# Patient Record
Sex: MALE | Race: BLACK OR AFRICAN AMERICAN | URBAN - METROPOLITAN AREA
[De-identification: names, ages, dates, MRNs, and addresses within clinical notes are randomized per-mention and may not be internally consistent; named-entity substitution may affect disease eponyms.]

---

## 2017-03-18 ENCOUNTER — HOSPITAL ENCOUNTER (EMERGENCY)
Facility: CLINIC | Age: 57
Discharge: HOME | End: 2017-03-18
Attending: EMERGENCY MEDICINE | Admitting: EMERGENCY MEDICINE
Payer: COMMERCIAL

## 2017-03-18 ENCOUNTER — APPOINTMENT (OUTPATIENT)
Dept: GENERAL RADIOLOGY | Facility: CLINIC | Age: 57
End: 2017-03-18
Attending: EMERGENCY MEDICINE
Payer: COMMERCIAL

## 2017-03-18 VITALS
TEMPERATURE: 98.3 F | OXYGEN SATURATION: 100 % | RESPIRATION RATE: 16 BRPM | DIASTOLIC BLOOD PRESSURE: 73 MMHG | WEIGHT: 142 LBS | BODY MASS INDEX: 21.03 KG/M2 | SYSTOLIC BLOOD PRESSURE: 105 MMHG | HEART RATE: 82 BPM | HEIGHT: 69 IN

## 2017-03-18 DIAGNOSIS — Z72.0 TOBACCO ABUSE: ICD-10-CM

## 2017-03-18 DIAGNOSIS — J20.9 ACUTE BRONCHITIS, UNSPECIFIED ORGANISM: ICD-10-CM

## 2017-03-18 LAB
ANION GAP SERPL CALCULATED.3IONS-SCNC: 6 MMOL/L (ref 3–14)
BASOPHILS # BLD AUTO: 0 10E9/L (ref 0–0.2)
BASOPHILS NFR BLD AUTO: 0.8 %
BUN SERPL-MCNC: 9 MG/DL (ref 7–30)
CALCIUM SERPL-MCNC: 8.5 MG/DL (ref 8.5–10.1)
CHLORIDE SERPL-SCNC: 109 MMOL/L (ref 94–109)
CO2 SERPL-SCNC: 26 MMOL/L (ref 20–32)
CREAT BLD-MCNC: 0.8 MG/DL (ref 0.66–1.25)
CREAT SERPL-MCNC: 0.8 MG/DL (ref 0.66–1.25)
D DIMER PPP FEU-MCNC: NORMAL UG/ML FEU (ref 0–0.5)
DIFFERENTIAL METHOD BLD: ABNORMAL
EOSINOPHIL # BLD AUTO: 0.5 10E9/L (ref 0–0.7)
EOSINOPHIL NFR BLD AUTO: 12.6 %
ERYTHROCYTE [DISTWIDTH] IN BLOOD BY AUTOMATED COUNT: 13.4 % (ref 10–15)
GFR SERPL CREATININE-BSD FRML MDRD: >90 ML/MIN/1.7M2
GFR SERPL CREATININE-BSD FRML MDRD: NORMAL ML/MIN/1.7M2
GLUCOSE SERPL-MCNC: 89 MG/DL (ref 70–99)
HCT VFR BLD AUTO: 44.3 % (ref 40–53)
HGB BLD-MCNC: 14.1 G/DL (ref 13.3–17.7)
IMM GRANULOCYTES # BLD: 0 10E9/L (ref 0–0.4)
IMM GRANULOCYTES NFR BLD: 0.3 %
LYMPHOCYTES # BLD AUTO: 1.2 10E9/L (ref 0.8–5.3)
LYMPHOCYTES NFR BLD AUTO: 31.6 %
MCH RBC QN AUTO: 27.8 PG (ref 26.5–33)
MCHC RBC AUTO-ENTMCNC: 31.8 G/DL (ref 31.5–36.5)
MCV RBC AUTO: 87 FL (ref 78–100)
MONOCYTES # BLD AUTO: 0.3 10E9/L (ref 0–1.3)
MONOCYTES NFR BLD AUTO: 7.8 %
NEUTROPHILS # BLD AUTO: 1.8 10E9/L (ref 1.6–8.3)
NEUTROPHILS NFR BLD AUTO: 46.9 %
NRBC # BLD AUTO: 0 10*3/UL
NRBC BLD AUTO-RTO: 0 /100
PLATELET # BLD AUTO: 220 10E9/L (ref 150–450)
POTASSIUM SERPL-SCNC: 4.2 MMOL/L (ref 3.4–5.3)
RBC # BLD AUTO: 5.07 10E12/L (ref 4.4–5.9)
SODIUM SERPL-SCNC: 141 MMOL/L (ref 133–144)
TROPONIN I BLD-MCNC: 0 UG/L (ref 0–0.1)
WBC # BLD AUTO: 3.7 10E9/L (ref 4–11)

## 2017-03-18 PROCEDURE — 93005 ELECTROCARDIOGRAM TRACING: CPT

## 2017-03-18 PROCEDURE — 82565 ASSAY OF CREATININE: CPT

## 2017-03-18 PROCEDURE — 84484 ASSAY OF TROPONIN QUANT: CPT

## 2017-03-18 PROCEDURE — 25000132 ZZH RX MED GY IP 250 OP 250 PS 637: Performed by: EMERGENCY MEDICINE

## 2017-03-18 PROCEDURE — 80048 BASIC METABOLIC PNL TOTAL CA: CPT | Performed by: EMERGENCY MEDICINE

## 2017-03-18 PROCEDURE — 71020 XR CHEST 2 VW: CPT

## 2017-03-18 PROCEDURE — 85379 FIBRIN DEGRADATION QUANT: CPT | Performed by: EMERGENCY MEDICINE

## 2017-03-18 PROCEDURE — 85025 COMPLETE CBC W/AUTO DIFF WBC: CPT | Performed by: EMERGENCY MEDICINE

## 2017-03-18 PROCEDURE — 99285 EMERGENCY DEPT VISIT HI MDM: CPT | Mod: 25

## 2017-03-18 RX ORDER — IBUPROFEN 200 MG
600 TABLET ORAL EVERY 8 HOURS PRN
Qty: 30 TABLET | Refills: 0 | Status: SHIPPED | OUTPATIENT
Start: 2017-03-18

## 2017-03-18 RX ORDER — BENZONATATE 100 MG/1
200 CAPSULE ORAL ONCE
Status: COMPLETED | OUTPATIENT
Start: 2017-03-18 | End: 2017-03-18

## 2017-03-18 RX ORDER — LIDOCAINE 40 MG/G
CREAM TOPICAL
Status: DISCONTINUED | OUTPATIENT
Start: 2017-03-18 | End: 2017-03-18 | Stop reason: HOSPADM

## 2017-03-18 RX ORDER — GUAIFENESIN/DEXTROMETHORPHAN 100-10MG/5
10 SYRUP ORAL ONCE
Status: COMPLETED | OUTPATIENT
Start: 2017-03-18 | End: 2017-03-18

## 2017-03-18 RX ORDER — AZITHROMYCIN 250 MG/1
TABLET, FILM COATED ORAL
Qty: 6 TABLET | Refills: 0 | Status: SHIPPED | OUTPATIENT
Start: 2017-03-18

## 2017-03-18 RX ORDER — GUAIFENESIN/DEXTROMETHORPHAN 100-10MG/5
10 SYRUP ORAL EVERY 4 HOURS PRN
Qty: 1 BOTTLE | Refills: 0 | Status: SHIPPED | OUTPATIENT
Start: 2017-03-18

## 2017-03-18 RX ADMIN — GUAIFENESIN AND DEXTROMETHORPHAN 10 ML: 100; 10 SYRUP ORAL at 19:23

## 2017-03-18 RX ADMIN — BENZONATATE 200 MG: 100 CAPSULE ORAL at 19:22

## 2017-03-18 ASSESSMENT — ENCOUNTER SYMPTOMS
COUGH: 1
FEVER: 0
CHILLS: 1
CHEST TIGHTNESS: 1

## 2017-03-18 NOTE — ED AVS SNAPSHOT
[facility]    [address] [place] [address]    Phone:  [phone]    Fax:  [phone]                                       Tayo Adame   MRN: [id number]    Department:  [facility]   Date of Visit:  3/18/2017           Patient Information     Date Of Birth          1960        Your diagnoses for this visit were:     Acute bronchitis, unspecified organism     Tobacco abuse        You were seen by Aryan Patton MD.      Follow-up Information     Follow up with [facility], [place] In 3 days.    Specialty:  Family Practice    Contact information:    [address] [facility]   [place] [address]  [phone]          Discharge Instructions       Discharge Instructions  Bronchitis, Pneumonia, Bronchospasm    You were seen today for a chest infection or inflammation. If your doctor decided this was due to a bacterial infection, you may need an antibiotic. Sometimes these are caused by a virus, and then an antibiotic will not help.     Return to the Emergency Department if:    Your breathing gets much worse.    You are very weak, or feel much more ill.    You develop new symptoms, such as chest pain.    You cough up blood.    You are vomiting enough that you can t keep fluids or your medicine down.    What can I do to help myself?    Fill any prescriptions the doctor gave you and take them right away--especially antibiotics. Be sure to finish the whole antibiotic prescription.    You may be given a prescription for an inhaler, which can help loosen tight air passages.  Use this as needed, but not more often than directed. Inhalers work much better when used with a spacer.     You may be given a prescription for a steroid to reduce inflammation. Used long-term, these can have many serious side effects, but for short courses these do not happen. You may notice restlessness or increased appetite.        You may use  "non-prescription cough or cold medicines. Cough medicines may help, but don t make the cough go away completely.     Avoid smoke, because this can make your symptoms worse. If you smoke, this may be a good time to quit! Consider using nicotine lozenges, gum, or patches to reduce cravings.     If you have a fever, Tylenol  (acetaminophen), Motrin  (ibuprofen), or Advil  (ibuprofen) may help bring fever down and may help you feel more comfortable. Be sure to read and follow the package directions, and ask your doctor if you have questions.    Be sure to get your flu shot each year.  The pneumonia shot can help prevent pneumonia.  Probiotics: If you have been given an antibiotic, you may want to also take a probiotic pill or eat yogurt with live cultures. Probiotics have \"good bacteria\" to help your intestines stay healthy. Studies have shown that probiotics help prevent diarrhea and other intestine problems (including C. diff infection) when you take antibiotics. You can buy these without a prescription in the pharmacy section of the store.     If your doctor has told you to follow-up at your clinic, be sure to call right away and go to your appointment.  If there is any problem with keeping your appointment, call your doctor or return to the Emergency Department.    If you were given a prescription for medicine here today, be sure to read all of the information (including the package insert) that comes with your prescription.  This will include important information about the medicine, its side effects, and any warnings that you need to know about.  The pharmacist who fills the prescription can provide more information and answer questions you may have about the medicine.  If you have questions or concerns that the pharmacist cannot address, please call or return to the Emergency Department.     Opioid Medication Information    Pain medications are among the most commonly prescribed medicines, so we are including " this information for all our patients. If you did not receive pain medication or get a prescription for pain medicine, you can ignore it.     You may have been given a prescription for an opioid (narcotic) pain medicine and/or have received a pain medicine while here in the Emergency Department. These medicines can make you drowsy or impaired. You must not drive, operate dangerous equipment, or engage in any other dangerous activities while taking these medications. If you drive while taking these medications, you could be arrested for DUI, or driving under the influence. Do not drink any alcohol while you are taking these medications.     Opioid pain medications can cause addiction. If you have a history of chemical dependency of any type, you are at a higher risk of becoming addicted to pain medications.  Only take these prescribed medications to treat your pain when all other options have been tried. Take it for as short a time and as few doses as possible. Store your pain pills in a secure place, as they are frequently stolen and provide a dangerous opportunity for children or visitors in your house to start abusing these powerful medications. We will not replace any lost or stolen medicine.  As soon as your pain is better, you should flush all your remaining medication.     Many prescription pain medications contain Tylenol  (acetaminophen), including Vicodin , Tylenol #3 , Norco , Lortab , and Percocet .  You should not take any extra pills of Tylenol  if you are using these prescription medications or you can get very sick.  Do not ever take more than 3000 mg of acetaminophen in any 24 hour period.    All opioids tend to cause constipation. Drink plenty of water and eat foods that have a lot of fiber, such as fruits, vegetables, prune juice, apple juice and high fiber cereal.  Take a laxative if you don t move your bowels at least every other day. Miralax , Milk of Magnesia, Colace , or Senna  can be used to  keep you regular.      Remember that you can always come back to the Emergency Department if you are not able to see your regular doctor in the amount of time listed above, if you get any new symptoms, or if there is anything that worries you.          24 Hour Appointment Hotline       To make an appointment at any [facility], call [phone] ([phone]). If you don't have a family doctor or clinic, we will help you find one. [place] clinics are conveniently located to serve the needs of you and your family.             Review of your medicines      START taking        Dose / Directions Last dose taken    azithromycin 250 MG tablet   Commonly known as:  ZITHROMAX   Quantity:  6 tablet        Two tablets first day, then one tablet daily for four days.   Refills:  0        guaiFENesin-dextromethorphan 100-10 MG/5ML syrup   Commonly known as:  ROBITUSSIN DM   Dose:  10 mL   Quantity:  1 Bottle        Take 10 mLs by mouth every 4 hours as needed for cough   Refills:  0        ibuprofen 200 MG tablet   Commonly known as:  ADVIL/MOTRIN   Dose:  600 mg   Quantity:  30 tablet        Take 3 tablets (600 mg) by mouth every 8 hours as needed for mild pain   Refills:  0          Our records show that you are taking the medicines listed below. If these are incorrect, please call your family doctor or clinic.        Dose / Directions Last dose taken    FLOMAX 0.4 MG capsule   Generic drug:  tamsulosin        Take by mouth daily   Refills:  0                Prescriptions were sent or printed at these locations (3 Prescriptions)                   Other Prescriptions                Printed at Department/Unit printer (3 of 3)         azithromycin (ZITHROMAX) 250 MG tablet               ibuprofen (ADVIL/MOTRIN) 200 MG tablet               guaiFENesin-dextromethorphan (ROBITUSSIN DM) 100-10 MG/5ML syrup                Procedures and tests performed during your visit     Basic metabolic panel    CBC with platelets  differential    Creatinine POCT    D dimer quantitative    EKG 12-lead, tracing only    ISTAT creatinine nursing POCT    ISTAT troponin nursing POCT    Peripheral IV catheter    Troponin POCT    XR Chest 2 Views      Orders Needing Specimen Collection     None      Pending Results     No orders found from 3/16/2017 to 3/19/2017.            Pending Culture Results     No orders found from 3/16/2017 to 3/19/2017.             Test Results from your hospital stay     3/18/2017  7:21 PM - Interface, Flexilab Results      Component Results     Component Value Ref Range & Units Status    WBC 3.7 (L) 4.0 - 11.0 10e9/L Final    RBC Count 5.07 4.4 - 5.9 10e12/L Final    Hemoglobin 14.1 13.3 - 17.7 g/dL Final    Hematocrit 44.3 40.0 - 53.0 % Final    MCV 87 78 - 100 fl Final    MCH 27.8 26.5 - 33.0 pg Final    MCHC 31.8 31.5 - 36.5 g/dL Final    RDW 13.4 10.0 - 15.0 % Final    Platelet Count 220 150 - 450 10e9/L Final    Diff Method Automated Method  Final    % Neutrophils 46.9 % Final    % Lymphocytes 31.6 % Final    % Monocytes 7.8 % Final    % Eosinophils 12.6 % Final    % Basophils 0.8 % Final    % Immature Granulocytes 0.3 % Final    Nucleated RBCs 0 0 /100 Final    Absolute Neutrophil 1.8 1.6 - 8.3 10e9/L Final    Absolute Lymphocytes 1.2 0.8 - 5.3 10e9/L Final    Absolute Monocytes 0.3 0.0 - 1.3 10e9/L Final    Absolute Eosinophils 0.5 0.0 - 0.7 10e9/L Final    Absolute Basophils 0.0 0.0 - 0.2 10e9/L Final    Abs Immature Granulocytes 0.0 0 - 0.4 10e9/L Final    Absolute Nucleated RBC 0.0  Final         3/18/2017  7:34 PM - Interface, Flexilab Results      Component Results     Component Value Ref Range & Units Status    D Dimer  0.0 - 0.50 ug/ml FEU Final    <0.3  This D-dimer assay is intended for use in conjuntion with a clinical pretest   probability assessment model to exclude pulmonary embolism (PE) and as an aid   in the diagnosis of deep venous thrombosis (DVT) in outpatients suspected of PE   or DVT. The  cut-off value is 0.5 g/mL FEU.           3/18/2017  7:38 PM - Interface, Flexilab Results      Component Results     Component Value Ref Range & Units Status    Sodium 141 133 - 144 mmol/L Final    Potassium 4.2 3.4 - 5.3 mmol/L Final    Chloride 109 94 - 109 mmol/L Final    Carbon Dioxide 26 20 - 32 mmol/L Final    Anion Gap 6 3 - 14 mmol/L Final    Glucose 89 70 - 99 mg/dL Final    Urea Nitrogen 9 7 - 30 mg/dL Final    Creatinine 0.80 0.66 - 1.25 mg/dL Final    GFR Estimate >90  Non  GFR Calc   >60 mL/min/1.7m2 Final    GFR Estimate If Black >90   GFR Calc   >60 mL/min/1.7m2 Final    Calcium 8.5 8.5 - 10.1 mg/dL Final         3/18/2017  7:54 PM - Interface, Radiant Ib      Narrative     XR CHEST 2 VW   3/18/2017 7:45 PM     HISTORY: cough    COMPARISON: None.    FINDINGS: The heart is negative.  The lungs are clear. The pulmonary  vasculature is normal.  The bones and soft tissues are unremarkable.        Impression     IMPRESSION: No active infiltrates are identified.        RADHA JONES MD               3/18/2017  7:15 PM - Interface, Flexilab Results      Component Results     Component Value Ref Range & Units Status    Creatinine 0.8 0.66 - 1.25 mg/dL Final    GFR Estimate >90 >60 mL/min/1.7m2 Final    GFR Estimate If Black >90 >60 mL/min/1.7m2 Final         3/18/2017  7:26 PM - Interface, Flexilab Results      Component Results     Component Value Ref Range & Units Status    Troponin I 0.00 0.00 - 0.10 ug/L Final                Clinical Quality Measure: Blood Pressure Screening     Your blood pressure was checked while you were in the emergency department today. The last reading we obtained was  BP: 110/76 . Please read the guidelines below about what these numbers mean and what you should do about them.  If your systolic blood pressure (the top number) is less than 120 and your diastolic blood pressure (the bottom number) is less than 80, then your blood pressure is normal.  "There is nothing more that you need to do about it.  If your systolic blood pressure (the top number) is 120-139 or your diastolic blood pressure (the bottom number) is 80-89, your blood pressure may be higher than it should be. You should have your blood pressure rechecked within a year by a primary care provider.  If your systolic blood pressure (the top number) is 140 or greater or your diastolic blood pressure (the bottom number) is 90 or greater, you may have high blood pressure. High blood pressure is treatable, but if left untreated over time it can put you at risk for heart attack, stroke, or kidney failure. You should have your blood pressure rechecked by a primary care provider within the next 4 weeks.  If your provider in the emergency department today gave you specific instructions to follow-up with your doctor or provider even sooner than that, you should follow that instruction and not wait for up to 4 weeks for your follow-up visit.        Thank you for choosing [facility]       Thank you for choosing [facility] for your care. Our goal is always to provide you with excellent care. Hearing back from our patients is one way we can continue to improve our services. Please take a few minutes to complete the written survey that you may receive in the mail after you visit with us. Thank you!        [employer] Information     [employer] lets you send messages to your doctor, view your test results, renew your prescriptions, schedule appointments and more. To sign up, go to [email or web address] . Click on \"Log in\" on the left side of the screen, which will take you to the Welcome page. Then click on \"Sign up Now\" on the right side of the page.     You will be asked to enter the access code listed below, as well as some personal information. Please follow the directions to create your username and password.     Your access code is: [email or web address]  Expires: 2017  8:41 PM     Your access code will  in 90 days. " If you need help or a new code, please call your [facility] clinic or [phone].        Care EveryWhere ID     This is your Care EveryWhere ID. This could be used by other organizations to access your [facility] medical records  [id number]        After Visit Summary       This is your record. Keep this with you and show to your community pharmacist(s) and doctor(s) at your next visit.

## 2017-03-18 NOTE — ED AVS SNAPSHOT
[facility] Emergency Department    [address]    [facility] [address]    Phone:  [phone]    Fax:  [phone]                                       Tayo Adame   MRN: [id number]    Department:  [facility] Emergency Department   Date of Visit:  3/18/2017           After Visit Summary Signature Page     I have received my discharge instructions, and my questions have been answered. I have discussed any challenges I see with this plan with the nurse or doctor.    ..........................................................................................................................................  Patient/Patient Representative Signature      ..........................................................................................................................................  Patient Representative Print Name and Relationship to Patient    ..................................................               ................................................  Date                                            Time    ..........................................................................................................................................  Reviewed by Signature/Title    ...................................................              ..............................................  Date                                                            Time

## 2017-03-18 NOTE — ED PROVIDER NOTES
"  History     Chief Complaint:  Cough    HPI   Tayo Adame is a 56 year old male with a history of smoking and prostate cancer who presents with cough. The patient reports that his symptoms began a few days ago with congestion and then he developed a cough. He admits to smoking 4-5 cigarettes daily.  Last night, he had difficulty sleeping because he was coughing so much. Because of this, he presented to the ED for evaluation. While in the ED, he also reports chills and pain right hemithorax when he coughs. He denies using any over the counter medication for his cough. He also denies fever, coughing up blood, or a history of diabetes, PE, or heart disease.     Of note, the patient completed his radiation for prostate cancer in December.       Allergies:  NKDA    Medications:    Flomax     Past Medical History:    Prostate cancer  Enlarged prostate  Lipoma of neck    Past Surgical History:    Orif jaw fracture  Hernia repair    Family History:  History reviewed.  No significant family history.     Social History:  Relationship status: Single  The patient currently smokes, 0.5 packs/day.   The patient drinks alcohol socially.      Review of Systems   Constitutional: Positive for chills. Negative for fever.   Respiratory: Positive for cough and chest tightness.    All other systems reviewed and are negative.    Physical Exam   First Vitals:  BP: 121/79  Pulse: 82  Heart Rate: 82  Temp: 98.3  F (36.8  C)  Resp: 16  Height: 175.3 cm (5' 9\")  Weight: 64.4 kg (142 lb)  SpO2: 98 %      Physical Exam  General: Alert, sitting up right in bed. Appears somnolent. Congested cough.  HEENT:   The scalp and head appear normal    The pupils are equal, round, and reactive to light    Extraocular muscles are intact.    The nose is normal.    The oropharynx is normal.      Uvula is in the midline.  There is no peritonsillar abscess.  Neck:  Normal range of motion.    Lungs:  Clear.      Tender in the mid axillary line about the " level of the 5th intercostal space on the   right side that worsens when he coughs    Crackles in RLL which cleared with coughing  Cardiac: Regular rate.      Normal S1 and S2.      No S3 or S4.      No pathological murmur.      No pericardial rub.  Abdomen: Soft. No distension. No tympani. No rebound. Non-tender.  Lymph: No anterior or posterior cervical lymphadenopathy noted.  MS:  Normal tone.      Normal movement of all extremities.    Neuro:  Normal mentation.  No focal motor or sensory changes.      Speech normal.  Psych:  Awake.     Alert.      Normal affect.      Appropriate interactions.  Skin:  No rash.      No lesions.      Emergency Department Course     ECG @ 1853  Indication: Chest pain  Rate 68 bpm.   MT interval 146 ms.   QRS duration 78 ms.   QT/QTc 400/425 ms.   P-R-T axes 67.  Notes: Normal sinus rhythm   Time read 1857    Imaging:  Radiographic findings were communicated with the patient who voiced understanding of the findings.    Chest XR per radiology:   IMPRESSION: No active infiltrates are identified.      Laboratory:  1905: Troponin POCT: 0.00 (WNL)  Creatinine POCT: Cr 0.80 (WNL) GFR >90 (WNL)  CBC: WBC 3.7 (L) HGB 14.1 (WNL)  (WNL)   BMP: Cr 0.80 (WNL) Glucose 89 (WNL)  Rest WNL  D dimer: <0.3 (WNL)     Interventions:  1922: Tessalon, 200 mg, PO  1923: Robitussin DM, 10 mLs, PO    ED Course:  The patient arrived in triage where his vitals were measured and recorded. The patient was then escorted back to the emergency department.  Nursing notes and past medical history reviewed.   I performed a physical examination of the patient as documented above.  I explained the plan with the patient who consents to this.   An ECG was obtained.   Blood was drawn and sent to the laboratory for testing, see above results.   A peripheral IV was established.   The patient received the above interventions.   The patient underwent the above imaging studies.   I personally reviewed the laboratory and  imaging results with the Patient and answered all related questions prior to discharge.   Findings and plan explained to the Patient. Patient discharged home with instructions regarding supportive care, medications, and reasons to return. The importance of close follow-up was reviewed.    WELLS SCORE (Pre-test probability for PE)    Clinical signs/Suspected DVT (3)  An alternative diagnosis is less likely than PE (PE is most likely diagnosis) (3)  HR > 100 (1.5)  Immobilization or surgery in the last 4 weeks (1.5)  Previous DVT or PE (1.5)  Hemoptysis (1)  Malignancy on treatment, treated in last 6 months, palliative)  (1)    Score: 1    Interpretation:    Total number of points, Interpretation based on literature:  0-1:  Probability of PE 3.6%  2-6:  Probability of PE 20%  > 6:  Probability of PE 66%    0-4:  Probability of PE 7.8% [PE unlikely]  >4:   Probability of PE 40% [PE likely]    Score combined with Negative d-dimer:  Score of 0-1 and Negative d-dimer: 99.5% NPV  Score of 0-4 and Negative d-dimer, 0.5% had VTE at 3 months.      AKUA 2006       Impression & Plan      Medical Decision Making:  This patient presents with several day history of coughing, which began with an upper respiratory infection, then settled into his chest. Here, he has a congested sounded coughing. He initially had some crackles in the right lower lobe, which cleared with coughing. He has been coughing up sputum, he hasn't looked at the color. He admits to smoking less than half pack of cigarettes daily and wants to quit. He's planning on seeing his doctor in [place] for that.     He also complained of pain on on the right pamela-thorax region with coughing. This was suggestive for a strained muscle. His chest x-ray is negative. An EKG was done because of his age and his smoking history. This was normal. His D-dimer was normal and his Wells criteria is 1 due to his history of a malignancy, which is not active currently, and his  D-dimer is <0.03. Troponin was 0.     My clinical impression is this patient has a viral process with a slightly low white count of 3.7. Remainder of his CBC with differential normal. He probably has an underlying element of chronic bronchitis due to his smoking. Because of this, he is at risk for mycoplasma. I am going to start a Macrobid for 5 days, azithromycin specifically. Additionally, I'll send him home with Robitussin DM. Patient asked about something for pain, and I'll give him ibuprofen. He should follow up with his primary medical doctor if his symptoms do not improve significantly or if he is developing new or worsening symptoms, he should return here.     Diagnosis:    ICD-10-CM    1. Acute bronchitis, unspecified organism J20.9    2. Tobacco abuse Z72.0    3. History of prostate cancer in remission.     Disposition:   Discharge to home with primary care follow up.     Discharge Medications:   Discharge Medication List as of 3/18/2017  8:46 PM      START taking these medications    Details   azithromycin (ZITHROMAX) 250 MG tablet Two tablets first day, then one tablet daily for four days., Disp-6 tablet, R-0, Local Print      ibuprofen (ADVIL/MOTRIN) 200 MG tablet Take 3 tablets (600 mg) by mouth every 8 hours as needed for mild pain, Disp-30 tablet, R-0, Local Print      guaiFENesin-dextromethorphan (ROBITUSSIN DM) 100-10 MG/5ML syrup Take 10 mLs by mouth every 4 hours as needed for cough, Disp-1 Bottle, R-0, Local Print               Shamika ARGUELLO, am serving as a scribe on 3/18/2017 at 6:20 PM to personally document services performed by Aryan Patton MD, based on my observations and the provider's statements to me.         Aryan Patton MD  03/18/17 1565

## 2017-03-18 NOTE — ED NOTES
Cough and chest congestion for the past 1-2 weeks.   Patient alert and oriented x3.  Airway, breathing and circulation intact.

## 2017-03-19 LAB — INTERPRETATION ECG - MUSE: NORMAL

## 2017-03-19 NOTE — ED NOTES
Discharge instructions gone over with pt, verbalized understanding. Discharged home with plan for follow up and prescriptions. Denies questions at time of discharge.

## 2017-03-19 NOTE — ED NOTES
Assumed pt care at this time. Pt resting on cart with eyes closed. Denies complaints at this time. Denies chest pain. Will medicate for cough per MD orders. Awaiting CXR. Will cont to monitor.

## 2019-08-06 ENCOUNTER — HOSPITAL (OUTPATIENT)
Dept: OTHER | Age: 59
End: 2019-08-06

## 2019-08-06 LAB — ETHANOL SERPL-MCNC: 103 MG/DL

## 2019-08-06 PROCEDURE — 99284 EMERGENCY DEPT VISIT MOD MDM: CPT | Performed by: EMERGENCY MEDICINE

## 2019-08-06 PROCEDURE — 12011 RPR F/E/E/N/L/M 2.5 CM/<: CPT | Performed by: EMERGENCY MEDICINE
